# Patient Record
Sex: MALE | Race: WHITE | NOT HISPANIC OR LATINO | URBAN - METROPOLITAN AREA
[De-identification: names, ages, dates, MRNs, and addresses within clinical notes are randomized per-mention and may not be internally consistent; named-entity substitution may affect disease eponyms.]

---

## 2014-09-08 LAB
PATH REPORT.COMMENTS IMP SPEC: ABNORMAL
PATH REPORT.COMMENTS IMP SPEC: ABNORMAL
PATH REPORT.FINAL DX SPEC: ABNORMAL
PATH REPORT.SITE OF ORIGIN SPEC: ABNORMAL
RESULT FLAG (HE HISTORICAL CONVERSION): ABNORMAL

## 2017-01-01 ENCOUNTER — HOME CARE/HOSPICE - HEALTHEAST (OUTPATIENT)
Dept: HOSPICE | Facility: HOSPICE | Age: 77
End: 2017-01-01

## 2017-01-01 ENCOUNTER — OFFICE VISIT - HEALTHEAST (OUTPATIENT)
Dept: ONCOLOGY | Facility: HOSPITAL | Age: 77
End: 2017-01-01

## 2017-01-01 ENCOUNTER — AMBULATORY - HEALTHEAST (OUTPATIENT)
Dept: INFUSION THERAPY | Facility: HOSPITAL | Age: 77
End: 2017-01-01

## 2017-01-01 ENCOUNTER — AMBULATORY - HEALTHEAST (OUTPATIENT)
Dept: ONCOLOGY | Facility: HOSPITAL | Age: 77
End: 2017-01-01

## 2017-01-01 ENCOUNTER — AMBULATORY - HEALTHEAST (OUTPATIENT)
Dept: GERIATRICS | Facility: CLINIC | Age: 77
End: 2017-01-01

## 2017-01-01 ENCOUNTER — COMMUNICATION - HEALTHEAST (OUTPATIENT)
Dept: ONCOLOGY | Facility: HOSPITAL | Age: 77
End: 2017-01-01

## 2017-01-01 ENCOUNTER — OFFICE VISIT - HEALTHEAST (OUTPATIENT)
Dept: GERIATRICS | Facility: CLINIC | Age: 77
End: 2017-01-01

## 2017-01-01 ENCOUNTER — HOSPITAL ENCOUNTER (OUTPATIENT)
Dept: RADIOLOGY | Facility: HOSPITAL | Age: 77
Discharge: HOME | End: 2017-04-27
Attending: INTERNAL MEDICINE

## 2017-01-01 ENCOUNTER — COMMUNICATION - HEALTHEAST (OUTPATIENT)
Dept: ADMINISTRATIVE | Facility: HOSPITAL | Age: 77
End: 2017-01-01

## 2017-01-01 ENCOUNTER — AMBULATORY - HEALTHEAST (OUTPATIENT)
Dept: HOSPICE | Facility: HOSPICE | Age: 77
End: 2017-01-01

## 2017-01-01 ENCOUNTER — RECORDS - HEALTHEAST (OUTPATIENT)
Dept: ADMINISTRATIVE | Facility: OTHER | Age: 77
End: 2017-01-01

## 2017-01-01 ENCOUNTER — COMMUNICATION - HEALTHEAST (OUTPATIENT)
Dept: FAMILY MEDICINE | Facility: CLINIC | Age: 77
End: 2017-01-01

## 2017-01-01 ENCOUNTER — INFUSION - HEALTHEAST (OUTPATIENT)
Dept: INFUSION THERAPY | Facility: HOSPITAL | Age: 77
End: 2017-01-01

## 2017-01-01 ENCOUNTER — OFFICE VISIT - HEALTHEAST (OUTPATIENT)
Dept: OCCUPATIONAL THERAPY | Facility: HOSPITAL | Age: 77
End: 2017-01-01

## 2017-01-01 DIAGNOSIS — R53.83 OTHER MALAISE AND FATIGUE: ICD-10-CM

## 2017-01-01 DIAGNOSIS — J96.01 ACUTE RESPIRATORY FAILURE WITH HYPOXIA (H): ICD-10-CM

## 2017-01-01 DIAGNOSIS — M50.30 DEGENERATION OF CERVICAL INTERVERTEBRAL DISC: ICD-10-CM

## 2017-01-01 DIAGNOSIS — M54.2 CHRONIC NECK PAIN: ICD-10-CM

## 2017-01-01 DIAGNOSIS — D45 POLYCYTHEMIA VERA (H): ICD-10-CM

## 2017-01-01 DIAGNOSIS — M50.30 DISC DISEASE, DEGENERATIVE, CERVICAL: ICD-10-CM

## 2017-01-01 DIAGNOSIS — E87.1 HYPONATREMIA: ICD-10-CM

## 2017-01-01 DIAGNOSIS — C83.13 MANTLE CELL LYMPHOMA OF INTRA-ABDOMINAL LYMPH NODES (H): ICD-10-CM

## 2017-01-01 DIAGNOSIS — J91.0 MALIGNANT PLEURAL EFFUSION (H): ICD-10-CM

## 2017-01-01 DIAGNOSIS — G89.3 CANCER ASSOCIATED PAIN: ICD-10-CM

## 2017-01-01 DIAGNOSIS — M54.6 THORACIC SPINE PAIN: ICD-10-CM

## 2017-01-01 DIAGNOSIS — E43 SEVERE MALNUTRITION (H): ICD-10-CM

## 2017-01-01 DIAGNOSIS — C83.10 MANTLE CELL LYMPHOMA, UNSPECIFIED BODY REGION (H): ICD-10-CM

## 2017-01-01 DIAGNOSIS — F11.20 OPIOID DEPENDENCE, CONTINUOUS (H): ICD-10-CM

## 2017-01-01 DIAGNOSIS — R53.81 OTHER MALAISE AND FATIGUE: ICD-10-CM

## 2017-01-01 DIAGNOSIS — M54.2 CERVICALGIA: ICD-10-CM

## 2017-01-01 DIAGNOSIS — G60.9 HEREDITARY AND IDIOPATHIC PERIPHERAL NEUROPATHY: ICD-10-CM

## 2017-01-01 DIAGNOSIS — G89.29 CHRONIC NECK PAIN: ICD-10-CM

## 2017-01-01 DIAGNOSIS — M79.18 MYOFASCIAL MUSCLE PAIN: ICD-10-CM

## 2017-01-01 DIAGNOSIS — M54.2 NECK PAIN: ICD-10-CM

## 2017-01-01 DIAGNOSIS — F32.9 MAJOR DEPRESSIVE DISORDER, SINGLE EPISODE, UNSPECIFIED: ICD-10-CM

## 2017-01-01 DIAGNOSIS — D47.2 MONOCLONAL PARAPROTEINEMIA: ICD-10-CM

## 2017-01-01 DIAGNOSIS — F43.21 ADJUSTMENT DISORDER WITH DEPRESSED MOOD: ICD-10-CM

## 2017-01-01 DIAGNOSIS — R10.9 ABDOMINAL PAIN: ICD-10-CM

## 2017-01-01 ASSESSMENT — MIFFLIN-ST. JEOR: SCORE: 1459.21

## 2017-06-12 ENCOUNTER — COMMUNICATION - HEALTHEAST (OUTPATIENT)
Dept: FAMILY MEDICINE | Facility: CLINIC | Age: 77
End: 2017-06-12

## 2017-06-14 ENCOUNTER — RECORDS - HEALTHEAST (OUTPATIENT)
Dept: ADMINISTRATIVE | Facility: OTHER | Age: 77
End: 2017-06-14

## 2021-05-30 VITALS — BODY MASS INDEX: 21.7 KG/M2 | WEIGHT: 160 LBS

## 2021-05-30 VITALS — WEIGHT: 145.5 LBS | BODY MASS INDEX: 19.2 KG/M2

## 2021-05-30 VITALS — BODY MASS INDEX: 22.15 KG/M2 | WEIGHT: 163.3 LBS

## 2021-05-30 VITALS — WEIGHT: 160.5 LBS | BODY MASS INDEX: 21.77 KG/M2

## 2021-05-30 VITALS — HEIGHT: 73 IN | WEIGHT: 156.6 LBS | BODY MASS INDEX: 20.75 KG/M2

## 2021-05-30 VITALS — WEIGHT: 161.4 LBS | BODY MASS INDEX: 21.89 KG/M2

## 2021-05-31 ENCOUNTER — RECORDS - HEALTHEAST (OUTPATIENT)
Dept: ADMINISTRATIVE | Facility: CLINIC | Age: 81
End: 2021-05-31

## 2021-06-01 ENCOUNTER — RECORDS - HEALTHEAST (OUTPATIENT)
Dept: ADMINISTRATIVE | Facility: CLINIC | Age: 81
End: 2021-06-01

## 2021-06-02 ENCOUNTER — RECORDS - HEALTHEAST (OUTPATIENT)
Dept: ADMINISTRATIVE | Facility: CLINIC | Age: 81
End: 2021-06-02

## 2021-06-05 ENCOUNTER — RECORDS - HEALTHEAST (OUTPATIENT)
Dept: PALLIATIVE MEDICINE | Facility: CLINIC | Age: 81
End: 2021-06-05

## 2021-06-05 ENCOUNTER — RECORDS - HEALTHEAST (OUTPATIENT)
Dept: LAB | Facility: CLINIC | Age: 81
End: 2021-06-05

## 2021-06-05 DIAGNOSIS — M47.812 CERVICAL SPONDYLOSIS WITHOUT MYELOPATHY: ICD-10-CM

## 2021-06-05 DIAGNOSIS — M47.12 CERVICAL SPONDYLOSIS WITH MYELOPATHY: ICD-10-CM

## 2021-06-05 DIAGNOSIS — D47.2 MONOCLONAL PARAPROTEINEMIA: ICD-10-CM

## 2021-06-08 NOTE — PROGRESS NOTES
[facility] Cancer Care Progress Note    Patient: Frankie Serna Jr.  MRN: [id number]  Date of Service: 1/25/2017        Reason for visit      1. Polycythemia vera    2. 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 Cervicalgia    4. Thoracic spine pain    5. Myofascial muscle pain        Assessment     1. Mantle cell lymphoma diagnosed with biopsy of right abdominal lymph node. Stage III or perhaps stage IV.  This was progressive with bilateral pleural effusions and massive lymphadenopathy and splenic megaly.  Started on palliative treatment with Imbruvica on November 23, 2016.  Response visible. His WBC is going up.  2. History of polycythemia currently on treatment with hydroxyurea and phlebotomy.  Current labs are stable.  3. Neuropathy.  4. 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  5. Back and abdominal discomfort which is most likely from his lymphoma.  He is currently on oxycodone 20 mg 4-5 times a day.  OxyContin apparently did not help.     Plan     1. Continue Imbruvica 560 mg by mouth daily.  Follow his labs and monitor for side effects.  2. Continue to take Hydrea same dose.   3. Discussed with him and his wife about decreasing pain medication.  4. Follow-up with me in 6 weeks' time.  5. Rx for baclofen sent.    Clinical stage      Possibly stage II    History     Frankie Serna Jr. is a very pleasant 76 y.o. old male with a history of lymphadenopathy located in his pelvis measuring up to 2.5 cm in size presenting on a CAT scan done for an unrelated reason back in September 2014.  This has worsened on his most recent CT scan done in April 2015.  This is by no means of bulky adenopathy however.  This has not been biopsied.  He has a prior history of polycythemia vera for which I saw him way back in 2011.  I did start him on hydroxyurea.  Which he has taken off and on.  He however then switched his care to [place] [facility].  Has been followed by doctors over there.        Wife thinks he has gone down neurologically. He has been seen at [facility] neurology clinic.    In August 2015 he had biopsy of the right external iliac LN biopsied that showed Mantle cell lymphoma. He was not recommended any treatment by Dr. North due to his comorbities.  He has since been followed.  He noticed more lymph nodes in his neck and armpit so came here to discuss his treatment options.  He was seen by Dr. North second week of October 2016.  Dr. North recommended hospice care.  He did not like it.  I offered to treat him with Ibrutinib if we can obtain it for him.  He was able to get that medication through a fanta and started that on 23rd of November 2016.  He thinks his lymph nodes might be getting slightly smaller.  He has some rash on his face and chest.    He continues to have pain and continues to take a lot of pain medication.  He describes having pain all over and his body but specifically sign his neck and back.  According to his wife he forgets when he has taken his last pain medication and insistent get getting another one. Recently I decreased his oxycodone to max of 8 pills a day. He is not happy with that. Comes in today for f/u.    Past Medical History     Past Medical History   Diagnosis Date     Depression      Hypertension      Lymphoma, non-Hodgkin's      Neuropathy      Polycythemia      Sleep apnea        Review of Systems   Constitutional  Constitutional (WDL): Exceptions to WDL  Fatigue: Fatigue not relieved by rest - Limiting instrumental ADL  Fever: None  Chills: None  Weight Gain: None  Weight Loss: None  Neurosensory  Neurosensory (WDL): All neurosensory elements are within defined limits  Cardiovascular  Cardiovascular (WDL): All cardiovascular elements are within defined limits  Pulmonary  Respiratory (WDL): Within Defined Limits  Gastrointestinal  Gastrointestinal (WDL): Exceptions to WDL  Anorexia: Oral intake altered without significant weight loss or malnutrition, oral  nutritional supplements indicated  Constipation: None  Diarrhea: None  Dysphagia: None  Esophagitis: None  Nausea: None  Pharyngitis: None  Vomiting: None  Dysgeusia: None  Dry Mouth: None  Genitourinary  Genitourinary (WDL): Exceptions to WDL  Urinary Frequency: Present  Urinary Retention: None  Urinary Tract Pain: None  Integumentary  Integumentary (WDL): Exceptions to WDL  Alopecia: None  Rash Maculo-Papular: None  Pruritus: None  Urticaria: Urticarial lesions covering <10% BSA, topical intervention indicated (sides of face)  Palmar-Plantar Erythrodysesthesia Syndrome: None  Flushing: None  Patient Coping  Patient Coping: Accepting  Accompanied by  Accompanied by: Family Member    ECOG performance status and Distress Assessment      ECOG Performance:    ECOG Performance Status: 1    Distress Assessment  Distress Assessment Score: No distress:     Pain Status  Currently in Pain: Yes    Vital Signs     Vitals:    01/25/17 1522   BP: 162/67   Pulse: 69   Temp: 97.4  F (36.3  C)   SpO2: 95%       Physical Exam     GENERAL: No acute distress. Cooperative in conversation.   HEENT: Pupils are equal, round and reactive. Oral mucosa is clean and intact. No ulcerations or mucositis noted. No bleeding noted.  RESP:Chest symmetric lungs are clear bilaterally per auscultation. Regular respiratory rate. No wheezes or rhonchi.  CV: Normal S1 S2 Regular, rate and rhythm. No murmurs.  ABD: Nondistended, soft, nontender. Positive bowel sounds. No organomegaly.   EXTREMITIES: No lower extremity edema.   NEURO: Non- focal. Alert and oriented x3.  Cranial nerves appear intact. He does have short term memory loss.  PSYCH: Within normal limits. No depression or anxiety.  SKIN: Fine rash on his face and chest. Warm dry intact.    LYMPH NODES: Bilateral cervical, supraclavicular, axillary lymph node examination was done.  Fairly good response in axillary adenopathy right more than the left.  The right axillary adenopathies is about 3 cm  in size previously 7 cm.  This appears smaller than before.  He also has enlarged lymph node in his neck in both posterior as well as some anterior cervical chains.  These lymph nodes are also mildly smaller in size.    Lab Results     Results for orders placed or performed in visit on 01/25/17   Magnesium   Result Value Ref Range    Magnesium 1.8 1.8 - 2.6 mg/dL   Comprehensive Metabolic Panel   Result Value Ref Range    Sodium 137 136 - 145 mmol/L    Potassium 4.2 3.5 - 5.0 mmol/L    Chloride 100 98 - 107 mmol/L    CO2 33 (H) 22 - 31 mmol/L    Anion Gap, Calculation 4 (L) 5 - 18 mmol/L    Glucose 72 70 - 125 mg/dL    BUN 21 8 - 28 mg/dL    Creatinine 0.81 0.70 - 1.30 mg/dL    GFR MDRD Af Amer >60 >60 mL/min/1.73m2    GFR MDRD Non Af Amer >60 >60 mL/min/1.73m2    Bilirubin, Total 0.7 0.0 - 1.0 mg/dL    Calcium 9.3 8.5 - 10.5 mg/dL    Protein, Total 7.2 6.0 - 8.0 g/dL    Albumin 3.3 (L) 3.5 - 5.0 g/dL    Alkaline Phosphatase 298 (H) 45 - 120 U/L    AST 33 0 - 40 U/L    ALT 39 0 - 45 U/L   Uric Acid   Result Value Ref Range    Uric Acid 8.3 (H) 3.0 - 8.0 mg/dL   HM1 (CBC with Diff)   Result Value Ref Range    WBC 43.3 (HH) 4.0 - 11.0 thou/uL    RBC 5.09 4.40 - 6.20 mill/uL    Hemoglobin 14.9 14.0 - 18.0 g/dL    Hematocrit 46.1 40.0 - 54.0 %    MCV 91 80 - 100 fL    MCH 29.2 27.0 - 34.0 pg    MCHC 32.2 32.0 - 36.0 g/dL    RDW 21.2 (H) 11.0 - 14.5 %    Platelets 430 140 - 440 thou/uL    MPV 7.4 7.0 - 10.0 fL     Reviewed path report from [place].    Imaging Results     No results found.    Total time spent was 40 minutes, more than half of it was in face-to-face counseling regarding disease state, treatment, side effects and management.      Derick Mendoza MD

## 2021-06-08 NOTE — PROGRESS NOTES
[facility]  Coping with Chronic Pain Group      Name:  Frankie Serna Jr.  :  1940  MRN:  [id number]    This is a dual signature report. My supervising clinician is Christiana Maier Psy.D., MAHNAZ.    Frequency:  Weekly  Facilitator:  Christiana Maier Psy.D., MAHNAZ SMITH and Sofía King PsyD, Postdoctoral Psychology Fellow    Date:  02/15/2017  Duration:  60 Minutes (1:00-2:00pm)   N= 7    The patient participated in a group session in which members processed the impact of mental health symptoms on their beliefs, feelings and behaviors.    Mental Status:    Mood:  euthymic  Affect:  Some evidence of flat affect and apathy  Suicidal Ideation:  absent  Homicidal Ideation:  absent  Thought process:  Somewhat tangential but easily redirected  Thought content:  Normal  Attention/Concentration:  intact  Language ability:  intact  Insight and Judgement:  fair  Orientation:  person, place, time and situation      SUBJECTIVE/OBJECTIVE:   Group content and interventions performed in this session:  [X]  Check-in.  [X]  Group members discussed triggers/coping strategies for mental health symptoms.  [X]  Psychologist taught connection between events, thoughts, feelings.  [X]  Psychologist elicited group reactions, questions, and normalized feelings.    The patient indicated readiness to learn by the patient s choice to attend group.  Patient education on the above topics was provided during this session.  The patient was given an opportunity to ask questions and participate in the group discussion.  The patient exhibited individual understanding by asking relevant questions and making appropriate comments to other group members.  No barriers to learning evidenced.    NECESSITY:  The group session was necessary for the care of the patient to address how his chronic pain affects the patient s mental health, self-esteem, relationships with self and others, sobriety, coping skills, physical capabilities and overall  well being.    ASSESSMENT:  The patient participated Actively in today's session.  The topic was on an introduction to group and sharing pain stories. Patient has wide-spread cancer related pain, which he feels has taken a lot from his quality of life. Patient experiences some cognitive decline and memory loss. He displayed minor apathy but was polite to others in group.   There was no evidence of suicidal ideation, intent or plan.    The patient's diagnoses include:  Adjustment Disorder with Depressed Mood    PLAN:    Continue weekly Coping with Chronic Pain Group Therapy to address mental health symptoms and improve quality of life.    Provider:  Sofía King PsyD, Postdoctoral Psychology Fellow    Date:  2/16/2017  Time:  8:48 AM    Services performed and documented by Sofía King PsyD, Postdoctoral Psychology Fellow    Supervisor: Christiana Maier Psy.D., LUIS, LP    I have read, discussed and agree with the documentation provided by Sofía King Psy.D., Postdoctoral Psychology Fellow.  Christiana Maier PsyD, LUIS, LP

## 2021-06-09 NOTE — PROGRESS NOTES
Telephone note.    Patients wife called that his pain is getting worse.  He is requesting pain medications every 3 hours instead of every 6 hours.  In addition he has not been able to get imbruvica due to insurance and financial reasons.  So I suspect his pain is probably getting worse because his lymphoma is getting worse.  I offered to treat him with some steroids but she insists that we should probably give him narcotics.  Due to the end-stage nature of his disease I am going to increase his narcotic pain medication.

## 2021-06-09 NOTE — PROGRESS NOTES
[facility] Cancer Care Progress Note    Patient: Frankie Serna Jr.  MRN: [id number]  Date of Service: 4/5/2017        Reason for visit      1. 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 Mantle cell lymphoma diagnosed with biopsy of right abdominal lymph node. Stage III or perhaps stage IV.  This was progressive with bilateral pleural effusions and massive lymphadenopathy and splenic megaly.  Started on palliative treatment with Imbruvica on November 23, 2016.  Response visible. However, he has not been able to get Imbruvica after initial 8 weeks of it. Now off of it due to insurance reasons. Apparently cannot afford Rituxan either. His LN are getting bigger.  2. History of polycythemia currently on treatment with hydroxyurea and phlebotomy.  Current labs are stable.  3. Neuropathy.  4. 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  5. Back and abdominal discomfort which is most likely from his lymphoma.  He is currently on oxycodone 20 mg 6 times a day.  OxyContin apparently did not help.   6. Ear wax impaction right ear.    Plan     1. Discussed with him that we don't have any other option for him that he can afford. I can try asking drug company for compassionate use of Opdivo to see if that would help.  2. Continue to take Hydrea same dose.   3. Discussed with him and his wife about  pain medication.  4. Advised to use positioning to help his shoulder pain.  5 advised for him to see an ENT to disimpact his right ear.    Clinical stage      Possibly stage II    History     Frankie Serna Jr. is a very pleasant 76 y.o. old male with a history of lymphadenopathy located in his pelvis measuring up to 2.5 cm in size presenting on a CAT scan done for an unrelated reason back in September 2014.  This has worsened on his most recent CT scan done in April 2015.  This is by no means of bulky adenopathy however.  This has not been biopsied.  He has a prior history of  polycythemia vera for which I saw him way back in 2011.  I did start him on hydroxyurea.  Which he has taken off and on.  He however then switched his care to [place] [facility].  Has been followed by doctors over there.       Wife thinks he has gone down neurologically. He has been seen at [facility] neurology clinic.    In August 2015 he had biopsy of the right external iliac LN biopsied that showed Mantle cell lymphoma. He was not recommended any treatment by Dr. North due to his comorbities.  He has since been followed.  He noticed more lymph nodes in his neck and armpit so came here to discuss his treatment options.  He was seen by Dr. North second week of October 2016.  Dr. North recommended hospice care.  He did not like it.  I offered to treat him with Ibrutinib if we can obtain it for him.  He was able to get that medication through a fanta and started that on 23rd of November 2016.  He thinks his lymph nodes might be getting slightly smaller.  He has some rash on his face and chest.    He continues to have pain and continues to take a lot of pain medication.  He describes having pain all over and his body but specifically sign his neck and back towards the left side.  According to his wife he forgets when he has taken his last pain medication and insistent get getting another one.  I had to increase the dose of his oxycodone to 20 mg 6 times a day total of 120 mg daily.  His pain is still there and according to his wife he wakes her up sometime even in the middle of the night to give him the pain medication. He is not happy with that.   In the meantime he has not been able to procure a Ibrutinib due to insurance reasons.  He only took it for about 8 weeks starting Thanksgiving of 2016 to January 16, 2017.  He had good response to it but now his lymph nodes are starting to get bigger again.  I then offered to treat him with single agent rituximab.  After checking through insurance that also was not financially  "feasible for them.    She comes in today to discuss his care as well as sharing concerned that he is developing lower extremity edema.  She tells me that his pain was significantly worse couple nights ago.  Then he laid down in his recliner and in fact did not require any pain medication for over 12 hours.  Since that time his pain has not been as bad.  He also continues to have difficult time hearing.  He does have plugged ear on his right side with earwax.    Past Medical History     Past Medical History:   Diagnosis Date     Depression      Hypertension      Lymphoma, non-Hodgkin's      Neuropathy      Polycythemia      Sleep apnea        Review of Systems   Constitutional  Constitutional (WDL): Exceptions to WDL  Fatigue: Fatigue relieved by rest  Neurosensory  Neurosensory (WDL): Exceptions to WDL  Ataxia: Asymptomatic, clinical or diagnostic observations only, intervention not indicated  Peripheral Sensory Neuropathy: Asymptomatic, loss of deep tendon reflexes or paresthesia (\"Toes ache\")  Cardiovascular  Cardiovascular (WDL): All cardiovascular elements are within defined limits  Pulmonary  Respiratory (WDL): Exceptions to WDL  Cough: Mild symptoms, nonprescription intervention indicated  Dyspnea: Shortness of breath with moderate exertion  Gastrointestinal  Gastrointestinal (WDL): Exceptions to WDL  Anorexia: Loss of appetite without alteration in eating habits  Constipation: Occasional or intermittent symptoms, occasional use of stool softeners, laxatives, dietary modification, or enema  Genitourinary  Genitourinary (WDL): All genitourinary elements are within defined limits  Integumentary  Integumentary (WDL): All integumentary elements are within defined limits  Patient Coping  Patient Coping: Accepting  Accompanied by  Accompanied by: Alone    ECOG performance status and Distress Assessment      ECOG Performance:    ECOG Performance Status: 1    Distress Assessment  Distress Assessment Score: 2:     Pain " Status  Currently in Pain: Yes    Vital Signs     Vitals:    04/05/17 1521   BP: 124/58   Pulse: 99   Temp: 98.1  F (36.7  C)   SpO2: 90%       Physical Exam     GENERAL: No acute distress. Cooperative in conversation.   HEENT: Pupils are equal, round and reactive. Oral mucosa is clean and intact. No ulcerations or mucositis noted. No bleeding noted.  His right ear is plugged with wax.  RESP:Chest symmetric lungs are clear bilaterally per auscultation. Regular respiratory rate. No wheezes or rhonchi.  CV: Normal S1 S2 Regular, rate and rhythm. No murmurs.  ABD: Nondistended, soft, nontender. Positive bowel sounds. No organomegaly.   EXTREMITIES: He has 2+ swelling on the dorsum of both of his feet.  Scrotal swelling as well.  NEURO: Non- focal. Alert and oriented x3.  Cranial nerves appear intact. He does have short term memory loss.  PSYCH: Within normal limits. No depression or anxiety.  SKIN: Fine rash on his face and chest. Warm dry intact.    LYMPH NODES: Bilateral cervical, supraclavicular, axillary lymph node examination was done. The LN are getting bigger. The right axillary adenopathies is about 4.5 cm in size previously 4cm.  This appears bigger than before.  He also has enlarged lymph node in his neck in both posterior as well as some anterior cervical chains.     Lab Results     Results for orders placed or performed in visit on 03/13/17   Comprehensive Metabolic Panel   Result Value Ref Range    Sodium 137 136 - 145 mmol/L    Potassium 4.5 3.5 - 5.0 mmol/L    Chloride 100 98 - 107 mmol/L    CO2 33 (H) 22 - 31 mmol/L    Anion Gap, Calculation 4 (L) 5 - 18 mmol/L    Glucose 100 70 - 125 mg/dL    BUN 20 8 - 28 mg/dL    Creatinine 1.04 0.70 - 1.30 mg/dL    GFR MDRD Af Amer >60 >60 mL/min/1.73m2    GFR MDRD Non Af Amer >60 >60 mL/min/1.73m2    Bilirubin, Total 0.6 0.0 - 1.0 mg/dL    Calcium 9.5 8.5 - 10.5 mg/dL    Protein, Total 7.5 6.0 - 8.0 g/dL    Albumin 3.4 (L) 3.5 - 5.0 g/dL    Alkaline Phosphatase 300  (H) 45 - 120 U/L    AST 28 0 - 40 U/L    ALT 20 0 - 45 U/L   Lactate Dehydrogenase (LDH)   Result Value Ref Range    LD (LDH) 313 (H) 125 - 220 U/L   HM1 (CBC with Diff)   Result Value Ref Range    WBC 15.1 (H) 4.0 - 11.0 thou/uL    RBC 4.87 4.40 - 6.20 mill/uL    Hemoglobin 15.9 14.0 - 18.0 g/dL    Hematocrit 47.0 40.0 - 54.0 %    MCV 97 80 - 100 fL    MCH 32.7 27.0 - 34.0 pg    MCHC 33.9 32.0 - 36.0 g/dL    RDW 21.5 (H) 11.0 - 14.5 %    Platelets 483 (H) 140 - 440 thou/uL    MPV 6.8 (L) 7.0 - 10.0 fL    Neutrophils % 86 (H) 50 - 70 %    Lymphocytes % 9 (L) 20 - 40 %    Monocytes % 3 2 - 10 %    Eosinophils % 2 0 - 6 %    Basophils % 0 0 - 2 %    Neutrophils Absolute 13.0 (H) 2.0 - 7.7 thou/uL    Lymphocytes Absolute 1.4 0.8 - 4.4 thou/uL    Monocytes Absolute 0.4 0.0 - 0.9 thou/uL    Eosinophils Absolute 0.3 0.0 - 0.4 thou/uL    Basophils Absolute 0.0 0.0 - 0.2 thou/uL   Manual Differential   Result Value Ref Range    Platelet Estimate Normal Normal    Ovalocytes 1+ (!) Negative         Imaging Results     No results found.          Derick Mendoza MD

## 2021-06-09 NOTE — PROGRESS NOTES
Psychology Psychotherapy Group  Coping with Chronic Pain Group      Name:  Frankie Serna Jr.  :  1940  MRN:  [id number]    This is a dual signature report. My supervising clinician is Christiana Maier Psy.D., MAHNAZ.    Frequency:  Weekly  Facilitator:  Christiana Maier Psy.D., MAHNAZ SMITH and Sofía King PsyD, Postdoctoral Psychology Fellow    Date:  02/15/2017  Duration:  60 Minutes (1:00-2:00pm)   N= 5    The patient participated in a group session in which members processed the impact of mental health symptoms on their beliefs, feelings and behaviors.    Mental Status:    Mood:  euthymic  Affect:  Some evidence of flat affect and apathy  Suicidal Ideation:  absent  Homicidal Ideation:  absent  Thought process:  Somewhat tangential but easily redirected  Thought content:  Normal  Attention/Concentration:  intact  Language ability:  intact  Insight and Judgement:  fair  Orientation:  person, place, time and situation      SUBJECTIVE/OBJECTIVE:   Group content and interventions performed in this session:  [X]  Check-in.  [X]  Group members discussed triggers/coping strategies for mental health symptoms.  [X]  Psychologist taught connection between events, thoughts, feelings.  [X]  Psychologist elicited group reactions, questions, and normalized feelings.    The patient indicated readiness to learn by the patient s choice to attend group.  Patient education on the above topics was provided during this session.  The patient was given an opportunity to ask questions and participate in the group discussion.  The patient exhibited individual understanding by asking relevant questions and making appropriate comments to other group members.  No barriers to learning evidenced.    NECESSITY:  The group session was necessary for the care of the patient to address how his chronic pain affects the patient s mental health, self-esteem, relationships with self and others, sobriety, coping skills, physical capabilities and overall  well being.    ASSESSMENT:  The patient participated minimally in today's session.  The topic was on education on the chronic pain cycle and how pain thoughts affect feelings and behaviors. Patient was more quite today but did share a few jokes with the group. He appeared to be retaining information and engaged in the five minute meditation without any concerns.   There was no evidence of suicidal ideation, intent or plan.    The patient's diagnoses include:  Adjustment Disorder with Depressed Mood    PLAN:    Continue weekly Coping with Chronic Pain Group Therapy to address mental health symptoms and improve quality of life.    Provider:  Sofía King PsyD, Postdoctoral Psychology Fellow    Date:  2/24/2017  Time:  8:48 AM    Services performed and documented by Sofía King PsyD, Postdoctoral Psychology Fellow    Supervisor: Christiana Maier Psy.D., LUIS, MAHNAZ    I have read, discussed and agree with the documentation provided by Sofía King Psy.D., Postdoctoral Psychology Fellow.  Christiana Maier PsyD, LUIS, LP

## 2021-06-09 NOTE — PROGRESS NOTES
[facility] Cancer Care Progress Note    Patient: Frankie Serna Jr.  MRN: [id number]  Date of Service: 3/13/2017        Reason for visit      1. Polycythemia vera    2. 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 Cervical Disc Degeneration    4. Abdominal pain        Assessment     1. Mantle cell lymphoma diagnosed with biopsy of right abdominal lymph node. Stage III or perhaps stage IV.  This was progressive with bilateral pleural effusions and massive lymphadenopathy and splenic megaly.  Started on palliative treatment with Imbruvica on November 23, 2016.  Response visible. However, he has not been able to get Imbruvica after initial 8 weeks of it. Now off of it due to insurance reasons.  2. History of polycythemia currently on treatment with hydroxyurea and phlebotomy.  Current labs are stable.  3. Neuropathy.  4. 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  5. Back and abdominal discomfort which is most likely from his lymphoma.  He is currently on oxycodone 20 mg 6 times a day.  OxyContin apparently did not help.     Plan     1. Discussed with him that although Imbruvica is the better choice, he is not able to obtain it. So will try Rituxan.  We will plan on starting his treatment next week.  He is not a candidate for cytotoxic chemotherapy as it is likely to make his dementia worse.  Rituximab might afford him some stabilization of his disease.  2. Continue to take Hydrea same dose.   3. Discussed with him and his wife about  pain medication.  4. Follow-up with vel Valdez in a week to start treatment.    Clinical stage      Possibly stage II    History     Frankie Serna Jr. is a very pleasant 76 y.o. old male with a history of lymphadenopathy located in his pelvis measuring up to 2.5 cm in size presenting on a CAT scan done for an unrelated reason back in September 2014.  This has worsened on his most recent CT scan done in April 2015.  This is by no means of bulky adenopathy  however.  This has not been biopsied.  He has a prior history of polycythemia vera for which I saw him way back in 2011.  I did start him on hydroxyurea.  Which he has taken off and on.  He however then switched his care to [place] [facility].  Has been followed by doctors over there.       Wife thinks he has gone down neurologically. He has been seen at [facility] neurology clinic.    In August 2015 he had biopsy of the right external iliac LN biopsied that showed Mantle cell lymphoma. He was not recommended any treatment by Dr. North due to his comorbities.  He has since been followed.  He noticed more lymph nodes in his neck and armpit so came here to discuss his treatment options.  He was seen by Dr. North second week of October 2016.  Dr. North recommended hospice care.  He did not like it.  I offered to treat him with Ibrutinib if we can obtain it for him.  He was able to get that medication through a fanta and started that on 23rd of November 2016.  He thinks his lymph nodes might be getting slightly smaller.  He has some rash on his face and chest.    He continues to have pain and continues to take a lot of pain medication.  He describes having pain all over and his body but specifically sign his neck and back.  According to his wife he forgets when he has taken his last pain medication and insistent get getting another one. Recently I had to increase the dose of his oxycodone to 20 mg 6 times a day total of 120 mg daily.  His pain is still there and according to his wife he wakes her up sometime even in the middle of the night to give him the pain medication. He is not happy with that. Comes in today for f/u.  In the meantime he has not been able to procure a Ibrutinib due to insurance reasons.  He only took it for about 8 weeks starting Thanksgiving of 2016.  He had good response to it but now his lymph nodes are starting to get bigger again.    Past Medical History     Past Medical History:   Diagnosis Date      Depression      Hypertension      Lymphoma, non-Hodgkin's      Neuropathy      Polycythemia      Sleep apnea        Review of Systems   Constitutional  Constitutional (WDL): Exceptions to WDL  Fatigue: Fatigue relieved by rest  Neurosensory  Neurosensory (WDL): Exceptions to WDL  Ataxia: Asymptomatic, clinical or diagnostic observations only, intervention not indicated (off balance)  Peripheral Sensory Neuropathy: Asymptomatic, loss of deep tendon reflexes or paresthesia (toes)  Cardiovascular  Cardiovascular (WDL): All cardiovascular elements are within defined limits  Pulmonary  Respiratory (WDL): Exceptions to WDL  Cough: Mild symptoms, nonprescription intervention indicated (gray to green phlem)  Dyspnea: Shortness of breath with moderate exertion  Gastrointestinal  Gastrointestinal (WDL): Exceptions to WDL  Anorexia: Loss of appetite without alteration in eating habits  Constipation: Occasional or intermittent symptoms, occasional use of stool softeners, laxatives, dietary modification, or enema  Genitourinary  Genitourinary (WDL): All genitourinary elements are within defined limits  Integumentary  Integumentary (WDL): Exceptions to WDL  Rash Maculo-Papular: Macules/papules covering <10% BSA with or without symptoms (e.g., pruritus, burning, tightness) (face)  Patient Coping  Patient Coping: Accepting  Accompanied by  Accompanied by: Family Member    ECOG performance status and Distress Assessment      ECOG Performance:    ECOG Performance Status: 1    Distress Assessment  Distress Assessment Score: 2:     Pain Status  Currently in Pain: Yes    Vital Signs     Vitals:    03/13/17 1328   BP: 141/65   Pulse: (!) 104   Temp: 97.6  F (36.4  C)   SpO2: 90%       Physical Exam     GENERAL: No acute distress. Cooperative in conversation.   HEENT: Pupils are equal, round and reactive. Oral mucosa is clean and intact. No ulcerations or mucositis noted. No bleeding noted.  RESP:Chest symmetric lungs are clear  bilaterally per auscultation. Regular respiratory rate. No wheezes or rhonchi.  CV: Normal S1 S2 Regular, rate and rhythm. No murmurs.  ABD: Nondistended, soft, nontender. Positive bowel sounds. No organomegaly.   EXTREMITIES: No lower extremity edema.   NEURO: Non- focal. Alert and oriented x3.  Cranial nerves appear intact. He does have short term memory loss.  PSYCH: Within normal limits. No depression or anxiety.  SKIN: Fine rash on his face and chest. Warm dry intact.    LYMPH NODES: Bilateral cervical, supraclavicular, axillary lymph node examination was done.  Fairly good response in axillary adenopathy right more than the left.  The right axillary adenopathies is about 4 cm in size previously 3 cm.  This appears bigger than before.  He also has enlarged lymph node in his neck in both posterior as well as some anterior cervical chains.  These lymph nodes are stable in size.    Lab Results     Results for orders placed or performed in visit on 03/13/17   Comprehensive Metabolic Panel   Result Value Ref Range    Sodium 137 136 - 145 mmol/L    Potassium 4.5 3.5 - 5.0 mmol/L    Chloride 100 98 - 107 mmol/L    CO2 33 (H) 22 - 31 mmol/L    Anion Gap, Calculation 4 (L) 5 - 18 mmol/L    Glucose 100 70 - 125 mg/dL    BUN 20 8 - 28 mg/dL    Creatinine 1.04 0.70 - 1.30 mg/dL    GFR MDRD Af Amer >60 >60 mL/min/1.73m2    GFR MDRD Non Af Amer >60 >60 mL/min/1.73m2    Bilirubin, Total 0.6 0.0 - 1.0 mg/dL    Calcium 9.5 8.5 - 10.5 mg/dL    Protein, Total 7.5 6.0 - 8.0 g/dL    Albumin 3.4 (L) 3.5 - 5.0 g/dL    Alkaline Phosphatase 300 (H) 45 - 120 U/L    AST 28 0 - 40 U/L    ALT 20 0 - 45 U/L   Lactate Dehydrogenase (LDH)   Result Value Ref Range    LD (LDH) 313 (H) 125 - 220 U/L   HM1 (CBC with Diff)   Result Value Ref Range    WBC 15.1 (H) 4.0 - 11.0 thou/uL    RBC 4.87 4.40 - 6.20 mill/uL    Hemoglobin 15.9 14.0 - 18.0 g/dL    Hematocrit 47.0 40.0 - 54.0 %    MCV 97 80 - 100 fL    MCH 32.7 27.0 - 34.0 pg    MCHC 33.9 32.0  - 36.0 g/dL    RDW 21.5 (H) 11.0 - 14.5 %    Platelets 483 (H) 140 - 440 thou/uL    MPV 6.8 (L) 7.0 - 10.0 fL     Reviewed path report from [place].    Imaging Results     No results found.    Total time spent was 40 minutes, more than half of it was in face-to-face counseling regarding disease state, treatment, side effects and management.      Derick Mendoza MD

## 2021-06-10 NOTE — PROGRESS NOTES
[facility]      Facility:    [facility] [[id number]]    Code Status: DNR/DNI/ Limited Interventions      Chief Complaint/Reason for Visit:  Chief Complaint   Patient presents with     Problem Visit     dyspnea/ malignant pleural effusion       HPI:   Frankie Serna Jr. is a 77 y.o. old male with history of Mantle Cell Lymphoma on palliative chemotherapy with Opdivo directly admitted to [facility] from oncology clinic. The patient minimizes his symptoms but his wife reports that he has been increasingly dyspneic on exertion and at rest. He has become increasingly weak , has minimlal appetite and has lost 30 pounds in about 3 months.He was saturating at less than 85% at the oncology clinic,       He was seen in oncology clinic today due to these symptoms and he was found to have large bilateral pleural effusions and hypoxia.  He had pleurocentesis on April 28 on the left side with 1.1 L, had a right tap on May 1 with 1.5 L. Complete cytology was pending at the time of his admission to TCU, but were thought to be malignant. He did not have a lot of relief after his pleural effusions were tapped.He had had some treatment for his mantle cell lymphoma but could not afford all the treatments. No fevers or chills but he does report hot flashes. No nausea or vomiting. His wife also reports that he is increasingly forgetful.          He was seen by palliative care, pain consult. They also approach the idea of hospice, he wanted to go to TCU possible that upon discharge from TCU he will enter into hospice. 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     He is attempting to do therapy but is very weak and deconditioned.    Family Conference today: I joined near the end, met with his wife (Racheal) and Julian; hard for her to care for him as weak as he is. We discussed that there may be need for changes upon discharge.    Past Medical History:  Past Medical History:    Diagnosis Date     Arthritis      Asthma      CHF (congestive heart failure)      Coronary artery disease      Depression      Disease of thyroid gland      Hypertension      Lymphoma, non-Hodgkin's      Neuropathy      Polycythemia      Sleep apnea            Surgical History:  Past Surgical History:   Procedure Laterality Date     ABDOMINAL SURGERY       BACK SURGERY       CARDIAC SURGERY       COLON SURGERY       EYE SURGERY       FRACTURE SURGERY       JOINT REPLACEMENT       SKIN BIOPSY       TONSILLECTOMY       VAGAL NERVE STIMULATOR REMOVAL              Review of Systems   Complains that his neck pain is not well controlled, although it is chronic  Feels about the same with regard to breathing and energy: both hard    Blood pressure 122/73, pulse 69, temperature 96.8  F (36  C), resp. rate 20, SpO2 95 %.          Physical Exam  Cachectic, pale, somewhat irritable today  Quiet  Neck: has a u-shaped pillow under his neck  Wide spread adenopathy neck, axillae, supraclavicular: enlarged and rubbery   Cardiovascular: Regular rhythm and normal heart sounds.  No murmur heard.  Pulmonary/Chest: He has wheezes. He has no rales, decreased at the posterior bases   Abdominal: Soft. There is no tenderness.   Musculoskeletal: He exhibits edema. Weak with low muscle mass   Lymphadenopathy:     He has cervical, axillary, supraclavicular adenopathy.   Neurological: He is alert and oriented to person, place, and time.   Skin: Skin is warm and dry. No rash noted. There is pallor.   Psychiatric: His behavior is normal.   Flat affect       Therapy report: transfers are unsafe, walked 400 ' FWW,stood 3 minutes, needs contact guard using the toilet. Bailey 24        Medication List:  Current Outpatient Prescriptions   Medication Sig     acetaminophen (TYLENOL) 500 MG tablet Take 500-1,000 mg by mouth every 6 (six) hours as needed for pain.     aspirin 81 mg chewable tablet Chew 81 mg daily.     baclofen (LIORESAL) 10 MG tablet  Take 5-10 mg by mouth 2 (two) times a day as needed.     BELSOMRA 15 mg Tab Take 15 mg by mouth at bedtime.     DULoxetine (CYMBALTA) 60 MG capsule Take 120 mg by mouth daily.      furosemide (LASIX) 20 MG tablet Take 1 tablet (20 mg total) by mouth daily.     gabapentin (NEURONTIN) 300 MG capsule Take 600 mg by mouth at bedtime as needed.      hydroxyurea (HYDREA) 500 mg capsule Take 500 mg by mouth 2 (two) times a day.     lidocaine (LIDODERM) 5 % Remove & Discard patch within 12 hours or as directed by MD     magnesium hydroxide (MILK OF MAG) 400 mg/5 mL Susp suspension Take 30 mL by mouth daily as needed.     melatonin 3 mg Tab tablet Take 1 tablet (3 mg total) by mouth at bedtime as needed.     multivitamin therapeutic (THERAGRAN) tablet Take 1 tablet by mouth daily.     senna-docusate (SENNOSIDES-DOCUSATE SODIUM) 8.6-50 mg tablet Take 2 tablets by mouth 2 (two) times a day.       Labs:      Assessment / Plan :     ICD-10-CM     1. Malignant pleural effusion J91.0 Still decreased at bases. Final cytology: Confirms is malignant effusion with Mantel Cell lymphoma      Addendum 2   The purpose of this addendum is to report results of [facility] FISH studies that demonstrate an 11;14 translocation, diagnostic of involvement by mantle cell lymphoma.     DIAGNOSIS:   PLEURAL FLUID, ASPIRATION WITH CELL BLOCK PREPARATION:       - MANTLE CELL LYMPHOMA           2. Mantle cell lymphoma, unspecified body region C83.10     3. Cancer associated pain G89.3 Morphine IRGabapentin, Cymbalta   4. Severe malnutrition E43     5. Chronic neck pain M54.2... Baclofen,Cymbalta, Lidoderm; will increase ,Morphine MSCONTIN   6 Major Depression F32.9 Duloxetine   7 Fatigue R53.81...    8 Opioid dependence F11.20    8. Hyponatremia E87.1 SIADH, follow          Electronically signed by: Flores Zhou MD

## 2021-06-10 NOTE — PROGRESS NOTES
"Call placed to bed board as patient is being admitted to P2 for observation for hypoxia.  Patient was in clinic today and saw Dr Mendoza.  He had a chest xray after the visit which showed \"Moderate to large bilateral pleural effusions, similar to previous. Underlying atelectasis in each lung base.\" Dr Mendoza would like the hospitalist to follow.  He is going to be in room 204.  Report to be called to P2 prior to his arrival.  He will be getting Opdivo prior to heading over.    Aliyah Nick RN 4/27/17 9001  "

## 2021-06-10 NOTE — PROGRESS NOTES
I was called from the first floor infusion to come and speak with Julian's wife, Sola.  She explained to me that her  was on a medication they could not afford, then prescribed Rituxan which again they cannot afford and she doesn't know what to do.  They were told by Dr. Mendoza that Opdivo may be something to try and they should get financial help for that.  Judi, financial counselor, provided Sola with written information she rec'd from the collab nurses and also had Sola sign an application to get financial assistance for Opdivo.       Given card with contact information on it. Informed of intake process and things that will be discussed with them today. Informed of services that I am able to assist with and that I can be a contact point should they need any assistance while getting treatment, or at any other point in the cancer care process. Thankful for information given. Offered a safe and supportive environment for discussion. Support and Caring offered throughout. Will follow up throughout appointments with cancer care.     I also went over some resources for them:    1.  [employer] Emergency Financial Assistance Application:  They do NOT qualify    I have explained what this program is about and gave the patient some written information.      I have gathered the necessary information from the patient and completed the online application       I told the patient and family to expect a reply within a week and to let me know if they have any questions or concerns regarding the outcome of their application or in how to obtain their fanta.    2.  [employer]: Julian *is in need of more financial assistance.  [employer] is an additional fanta available through the [employer].  I explained the fund to the patient and gave them some written information as well.  I said to expect a response in the next two weeks.  I have done the on-line application on their behalf.  They were very appreciative  of the information.      3.  Julian Selby would benefit from a meals on wheels type program. Spoke with Sola and states that  She would like to think about it..   [employer] is a free resource for patients with cancer. They make fresh meals weekly and deliver them 1 x per week to patients and families. The meals can be for up to 4 people in the household. If you do not live in the delivery area then you can have someone pick your meals up for you. [employer] is located at [address] [place] [address] the crossroads are [facility] and [place].  times are M-Th 11-6 and Friday 11-3.   I have mailed an application and general information about the program and sent it specifically to Sola per her request.    4.  [employer] Travel Assistance Program: Julian Selby may qualify  for assistance through the LLS (leukemia/Lymphoma Society).  This program offers the patient some assistance for their travel costs for treatment.  I have sent the application and general information directly to Sola per her request.  I told her to let me know if she has any questions.

## 2021-06-10 NOTE — PROGRESS NOTES
[facility]      Facility:    [facility] [[id number]]    Code Status: DNR/DNI/ Limited Interventions      Chief Complaint/Reason for Visit:  Chief Complaint   Patient presents with     H & P     Mantle Lymphoma   4/27 - 5/2/17 at [facility]    HPI:   Frankie Serna Jr. is a 77 y.o. old male with history of Mantle Cell Lymphoma on palliative chemotherapy with Opdivo directly admitted to [facility] from oncology clinic. The patient minimizes his symptoms but his wife reports that he has been increasingly dyspneic on exertion and   at rest. He has become increasingly weak ,  has minimlal appetite and has lost 30 pounds in about 3 months.  Saturating at less than 85% at the oncology clinic, had had some treatment for his mantle cell lymphoma but could not afford all the treatments.  No fevers or chills but he does report hot flashes. No nausea or vomiting. His wife also reports that he is increasingly forgetful.   He was seen in oncology clinic today due to these symptoms and he was found to have large bilateral pleural effusions and hypoxia.  He had pleurocentesis on April 28 on the left side with 1.1 L, had a right tap on May 1 with 1.5 L.  Complete cytology was pending at the time of his admission to TCU, but were thought to be malignant.  He did not have a lot of relief after his pleural effusions were tapped.    He was seen by palliative care, pain consult.  They also approach the idea of hospice, he wanted to go to TCU possible that upon discharge from TCU he will enter into hospice.  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    He is attempting to do therapy but is very weak and deconditioned.    Past Medical History:  Past Medical History:   Diagnosis Date     Arthritis      Asthma      CHF (congestive heart failure)      Coronary artery disease      Depression      Disease of thyroid gland      Hypertension      Lymphoma, non-Hodgkin's       Neuropathy      Polycythemia      Sleep apnea            Surgical History:  Past Surgical History:   Procedure Laterality Date     ABDOMINAL SURGERY       BACK SURGERY       CARDIAC SURGERY       COLON SURGERY       EYE SURGERY       FRACTURE SURGERY       JOINT REPLACEMENT       SKIN BIOPSY       TONSILLECTOMY       VAGAL NERVE STIMULATOR REMOVAL         Family History:   Family History   Problem Relation Age of Onset     Stroke Mother      Cancer Sister      Breast cancer Sister      Lymphoma Sister      No Medical Problems Son      No Medical Problems Son      No Medical Problems Daughter      No Medical Problems Daughter        Social History:    Social History     Social History     Marital status:      Spouse name: N/A     Number of children: N/A     Years of education: N/A     Social History Main Topics     Smoking status: Never Smoker     Smokeless tobacco: Never Used     Alcohol use No     Drug use: No     Sexual activity: Not on file     Other Topics Concern     Not on file     Social History Narrative          Review of Systems    There were no vitals filed for this visit.    Physical Exam    Medication List:  Current Outpatient Prescriptions   Medication Sig     acetaminophen (TYLENOL) 500 MG tablet Take 500-1,000 mg by mouth every 6 (six) hours as needed for pain.     aspirin 81 mg chewable tablet Chew 81 mg daily.     baclofen (LIORESAL) 10 MG tablet Take 5-10 mg by mouth 2 (two) times a day as needed.     BELSOMRA 15 mg Tab Take 15 mg by mouth at bedtime.     DULoxetine (CYMBALTA) 60 MG capsule Take 120 mg by mouth daily.      furosemide (LASIX) 20 MG tablet Take 1 tablet (20 mg total) by mouth daily.     gabapentin (NEURONTIN) 300 MG capsule Take 600 mg by mouth at bedtime as needed.      hydroxyurea (HYDREA) 500 mg capsule Take 500 mg by mouth 2 (two) times a day.     lidocaine (LIDODERM) 5 % Remove & Discard patch within 12 hours or as directed by MD     magnesium hydroxide (MILK OF MAG)  400 mg/5 mL Susp suspension Take 30 mL by mouth daily as needed.     melatonin 3 mg Tab tablet Take 1 tablet (3 mg total) by mouth at bedtime as needed.     morphine (MSIR) 15 MG tablet Take 1-2 tablets (15-30 mg total) by mouth every 4 (four) hours as needed.     morphine (MSIR) 15 MG tablet Take 2 tablets (30 mg total) by mouth 3 (three) times a day for 10 days.     multivitamin therapeutic (THERAGRAN) tablet Take 1 tablet by mouth daily.     polyethylene glycol (MIRALAX) 17 gram packet Take 1 packet (17 g total) by mouth daily for 3 days.     senna-docusate (SENNOSIDES-DOCUSATE SODIUM) 8.6-50 mg tablet Take 2 tablets by mouth 2 (two) times a day.       Labs:  5/1/17         WBC 4.0 - 11.0 thou/uL 23.1 (H)   Hemoglobin 14.0 - 18.0 g/dL 17.5   Hematocrit 40.0 - 54.0 % 53.4   Platelets 140 - 440 thou/uL 375   MPV 8.5 - 12.5 fL 9.2   Neutrophils % 50 - 70 % 88 (H)   Lymphocytes % 20 - 40 % 7 (L)   Monocytes % 2 - 10 % 3   Eosinophils % 0 - 6 % 2     5/1/17         Sodium 136 - 145 mmol/L 128 (L)   Potassium 3.5 - 5.0 mmol/L 4.9   Chloride 98 - 107 mmol/L 89 (L)   CO2 22 - 31 mmol/L 32 (H)   Anion Gap, Calculation 5 - 18 mmol/L 7   Glucose 70 - 125 mg/dL 92   Calcium 8.5 - 10.5 mg/dL 8.9   BUN 8 - 28 mg/dL 21   Creatinine 0.70 - 1.30 mg/dL 0.73   GFR MDRD Non Af Amer >60 mL/min/1.73m2 >60           Assessment / Plan:    ICD-10-CM    1. Malignant pleural effusion J91.0 Still decreased at bases. Final cytology pending.Lasix   2. Mantle cell lymphoma, unspecified body region C83.10    3. Cancer associated pain G89.3 Morphine IRGabapentin, Cymbalta   4. Severe malnutrition E43    5. Cervical Disc Degeneration M50.30 Baclofen,Cymbalta, LidodermMorphi   6. Hyponatremia E87.1 SIADH, follow             Electronically signed by: Flores Zhou MD

## 2021-06-10 NOTE — PROGRESS NOTES
[facility]      Facility:    ANSWER ROOMING ACTIVITY QUESTION    Code Status: DNR/DNI/ Limited Interventions      Chief Complaint/Reason for Visit:  Chief Complaint   Patient presents with     Problem Visit       HPI:   Frankie Serna Jr. is a 77 y.o. old male with history of Mantle Cell Lymphoma on palliative chemotherapy with Opdivo directly admitted to [facility] from oncology clinic. The patient minimizes his symptoms but his wife reports that he has been increasingly dyspneic on exertion and at rest. He has become increasingly weak , has minimlal appetite and has lost 30 pounds in about 3 months.  Saturating at less than 85% at the oncology clinic, had had some treatment for his mantle cell lymphoma but could not afford all the treatments. No fevers or chills but he does report hot flashes. No nausea or vomiting. His wife also reports that he is increasingly forgetful.     He was seen in oncology clinic today due to these symptoms and he was found to have large bilateral pleural effusions and hypoxia.  He had pleurocentesis on April 28 on the left side with 1.1 L, had a right tap on May 1 with 1.5 L. Complete cytology was pending at the time of his admission to TCU, but were thought to be malignant. He did not have a lot of relief after his pleural effusions were tapped.     He was seen by palliative care, pain consult. They also approach the idea of hospice, he wanted to go to TCU possible that upon discharge from TCU he will enter into hospice. 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     He is attempting to do therapy but is very weak and deconditioned.    Family Conference today: I joined near the end, met with his wife (Racheal) and Julian; hard for her to care for him as weak as he is. We discussed that there may be need for changes upon discharge.    Past Medical History:  Past Medical History:   Diagnosis Date     Arthritis      Asthma      CHF  (congestive heart failure)      Coronary artery disease      Depression      Disease of thyroid gland      Hypertension      Lymphoma, non-Hodgkin's      Neuropathy      Polycythemia      Sleep apnea            Surgical History:  Past Surgical History:   Procedure Laterality Date     ABDOMINAL SURGERY       BACK SURGERY       CARDIAC SURGERY       COLON SURGERY       EYE SURGERY       FRACTURE SURGERY       JOINT REPLACEMENT       SKIN BIOPSY       TONSILLECTOMY       VAGAL NERVE STIMULATOR REMOVAL              Review of Systems   Feels about the same with regard to breathing and energy: both hard    Vitals reviewed      Physical Exam     Cachectic, pale  Pleasant  Quiet  Neck: has a u-shaped pillow under his neck  Wide spread adenopathy neck, axillae, supraclavicular: enlarged and rubbery   Cardiovascular: Regular rhythm and normal heart sounds.    No murmur heard.  Pulmonary/Chest: He has wheezes. He has no rales.   Abdominal: Soft. There is no tenderness.   Musculoskeletal: He exhibits edema. WeakLow muscle mass   Lymphadenopathy:     He has cervical, axillary, supraclavicular adenopathy.   Neurological: He is alert and oriented to person, place, and time.   Skin: Skin is warm and dry. No rash noted. There is pallor.   Psychiatric: His behavior is normal.   Flat affect       Therapy report: transfers are unsafe, walked 400 ' FWW,stood 3 minutes, needs contact guard using the toilet. [place] 24        Medication List:  Current Outpatient Prescriptions   Medication Sig     acetaminophen (TYLENOL) 500 MG tablet Take 500-1,000 mg by mouth every 6 (six) hours as needed for pain.     aspirin 81 mg chewable tablet Chew 81 mg daily.     baclofen (LIORESAL) 10 MG tablet Take 5-10 mg by mouth 2 (two) times a day as needed.     BELSOMRA 15 mg Tab Take 15 mg by mouth at bedtime.     DULoxetine (CYMBALTA) 60 MG capsule Take 120 mg by mouth daily.      furosemide (LASIX) 20 MG tablet Take 1 tablet (20 mg total) by mouth daily.      gabapentin (NEURONTIN) 300 MG capsule Take 600 mg by mouth at bedtime as needed.      hydroxyurea (HYDREA) 500 mg capsule Take 500 mg by mouth 2 (two) times a day.     lidocaine (LIDODERM) 5 % Remove & Discard patch within 12 hours or as directed by MD     magnesium hydroxide (MILK OF MAG) 400 mg/5 mL Susp suspension Take 30 mL by mouth daily as needed.     melatonin 3 mg Tab tablet Take 1 tablet (3 mg total) by mouth at bedtime as needed.     morphine (MSIR) 15 MG tablet Take 1-2 tablets (15-30 mg total) by mouth every 4 (four) hours as needed.     morphine (MSIR) 15 MG tablet Take 2 tablets (30 mg total) by mouth 3 (three) times a day for 10 days.     multivitamin therapeutic (THERAGRAN) tablet Take 1 tablet by mouth daily.     senna-docusate (SENNOSIDES-DOCUSATE SODIUM) 8.6-50 mg tablet Take 2 tablets by mouth 2 (two) times a day.       Labs:      Assessment / Plan :     ICD-10-CM     1. Malignant pleural effusion J91.0 Still decreased at bases. Final cytology: Confirms is malignant effusion with Mantel Cell lymphoma      Addendum 2   The purpose of this addendum is to report results of [facility] FISH studies that demonstrate an 11;14 translocation, diagnostic of involvement by mantle cell lymphoma.     DIAGNOSIS:   PLEURAL FLUID, ASPIRATION WITH CELL BLOCK PREPARATION:       - MANTLE CELL LYMPHOMA           2. Mantle cell lymphoma, unspecified body region C83.10     3. Cancer associated pain G89.3 Morphine IRGabapentin, Cymbalta   4. Severe malnutrition E43     5. Chronic neck pain M54.2... Baclofen,Cymbalta, Lidoderm,Morphine   6 Major Depression F32.9 Duloxetine   7 Fatigue R53.81...    8 Acute Respiratory failure J96.01    9. Hyponatremia E87.1 SIADH, follow          Electronically signed by: Flores Zhou MD

## 2021-06-10 NOTE — PROGRESS NOTES
Patient arrived ambulatory after clinic visit at approximately 2:15. Oxygen saturation noted 85% on room air/ oxygen applied at 1L via Nasal cannula with O2 saturation improved to 91-92%. Patient denies feeling Short of breath. Patient/wife ordered lunch due to missing lunch today/ both ate lunch during visit. IV access obtained right forearm. Patient to receive Opdivo during visit today but did receive notification from Aliyah SWAIN Opdivo not approved at this time for patient to receive/ patient to be admitted to P2 inpatient unit for further care as ordered per Dr. Mendoza - patient and wife both agree. Patient taken to room 204 on P2 at 1520 via wheelchair for hospital admission/ report was called to P2 RN per Aliyah SWAIN.

## 2021-06-10 NOTE — PROGRESS NOTES
[facility]    Facility:   [facility] [[id number]]     Code Status: DNR/DNI/ Limited Interventions  PCP: Saeed Grajeda MD   Phone: [phone]   Fax: [phone]      CHIEF COMPLAINT/REASON FOR VISIT:  Chief Complaint   Patient presents with     Discharge Summary     Mantle cell lymphoma       HISTORY COURSE:  Has a history of mantle cell lymphoma who has been on palliative chemotherapy.  He has lost approximately 30 pounds in 3 months, has become increasingly weak with dyspnea on exertion and finally dyspnea at rest.  He was at the oncology clinic when his O2 saturation was less than 85%.  He had large bibasilar pleural effusions and hypoxia.  He was admitted to the hospital had pleurocentesis on the left 1.1 L, on the right 1.5 L.  Cytology did come back with mantle cell lymphoma.    He was at [facility] for rehabilitation.  He was quite malnourished but did get to the point where he was able to transfer, walk short distances with his walker independently.  With this level of independence his wife felt he could return home, hospice is going to be involved at discharge.  Note he had been on morphine MS Contin 60 mg 3 times a day, had also been on morphine immediate acting.  Julian himself asked that the short acting be discontinued.    He needs to be on oxygen at 3 L/min.    Review of Systems     He looks forward to going home, he is still pretty fatigued.    Vitals:    05/18/17 0923   BP: 132/63   Pulse: (!) 103   Resp: 18   Temp: 96.7  F (35.9  C)   SpO2: 93%       Physical Exam    Cachectic, pale, smiling and more relaxed  Quiet  Wide spread adenopathy neck, axillae, supraclavicular: enlarged and rubbery   Cardiovascular: Regular rhythm and normal heart sounds.  No murmur heard.  Pulmonary/Chest: He has no rales,  But decreased at the posterior bases   Abdominal: Soft. There is no tenderness.   Musculoskeletal: He exhibits edema. Weak with low muscle mass    Lymphadenopathy:     He has cervical, axillary, supraclavicular adenopathy.   Neurological: He is alert and oriented to person, place, and time.   Skin: Skin is warm and dry. No rash noted. There is pallor.   Psychiatric: His behavior is normal.   Flat affect       MEDICATION LIST:  Current Outpatient Prescriptions   Medication Sig     acetaminophen (TYLENOL) 500 MG tablet Take 500-1,000 mg by mouth every 6 (six) hours as needed for pain.     aspirin 81 mg chewable tablet Chew 81 mg daily.     atropine 1 % ophthalmic solution Place 1-2 drops under the tongue see administration instructions. 1-2 drops prn every 4hours as needed for excessive secetions per MARKUS kit  Indications: secretions     baclofen (LIORESAL) 10 MG tablet Take 5-10 mg by mouth 2 (two) times a day as needed.     BELSOMRA 15 mg Tab Take 15 mg by mouth at bedtime.     bisacodyl (DULCOLAX) 10 mg suppository Insert 10 mg into the rectum daily as needed (constipation). as needed for constipation per MARKUS kit  Indications: Constipation     DULoxetine (CYMBALTA) 60 MG capsule Take 120 mg by mouth daily.      furosemide (LASIX) 20 MG tablet Take 1 tablet (20 mg total) by mouth daily.     gabapentin (NEURONTIN) 300 MG capsule Take 600 mg by mouth at bedtime as needed.      haloperidol (HALDOL) 2 mg/mL solution Take 0.5-2 mg by mouth see administration instructions. 0.5mg-2mg prn every 4hours for nausea/or delirium per MARKUS kit  Indications: Delirium, nausea     hydroxyurea (HYDREA) 500 mg capsule Take 500 mg by mouth 2 (two) times a day.     lidocaine (LIDODERM) 5 % Remove & Discard patch within 12 hours or as directed by MD     LORazepam (ATIVAN) 0.5 MG tablet Take 0.5-2 mg by mouth see administration instructions. 0.5-2mg prn every 4hours as need for anxiety per MARKUS kit  Indications: anxiety     magnesium hydroxide (MILK OF MAG) 400 mg/5 mL Susp suspension Take 30 mL by mouth daily as needed.     melatonin 3 mg Tab tablet Take 1 tablet (3 mg total) by  mouth at bedtime as needed.     morphine (MS CONTIN) 60 MG 12 hr tablet Take 60 mg by mouth 3 (three) times a day. [id number]  Indications: Severe Pain     morphine 20 mg/1 mL concentrated solution Take 5-10 mg by mouth every 4 (four) hours as needed for pain (SOB). Indications: pain/dyspnea     multivitamin therapeutic (THERAGRAN) tablet Take 1 tablet by mouth daily.     senna-docusate (SENNOSIDES-DOCUSATE SODIUM) 8.6-50 mg tablet Take 2 tablets by mouth 2 (two) times a day.       DISCHARGE DIAGNOSIS:         ICD-10-CM          ICD-10-CM    1. Malignant pleural effusion J91.0    2. Mantle cell lymphoma, unspecified body region C83.10    3. Cancer associated pain G89.3    4. Severe malnutrition E43    5. Peripheral Neuropathy G60.9    6. Major Depression, Single Episode F32.9    7. Hyponatremia E87.1    8. Fatigue R53.81     R53.83    9. Cervical Disc Degeneration M50.30    10. Chronic neck pain M54.2     G89.29    11. Opioid dependence, continuous F11.20               MEDICAL EQUIPMENT NEEDS:  He is going home with  [facility] hospice is given oxygen, electric bed, wheelchair, front wheeled walker with skis on the back.    DISCHARGE PLAN/FACE TO FACE:  I certify that services are/were furnished while this patient was under the care of a physician and that a physician or an allowed non-physician practitioner (NPP), had a face-to-face encounter that meets the physician face-to-face encounter requirements. The encounter was in whole, or in part, related to the primary reason for home health. The patient is confined to his/her home and needs intermittent skilled nursing, physical therapy, speech-language pathology, or the continued need for occupational therapy. A plan of care has been established by a physician and is periodically reviewed by a physician.  Date of Face-to-Face Encounter: 5/18/17    [facility] Care : he has been certified to have fewer than 6 months to live         The patient is, or has  been, under my care and I have initiated the establishment of the plan of care. This patient will be followed by a physician who will periodically review the plan of care.      Electronically signed by: Flores Zhou MD

## 2021-06-10 NOTE — PROGRESS NOTES
[facility]      Facility:    [facility] [[id number]]    Code Status: DNR/DNI/ Limited Interventions      Chief Complaint/Reason for Visit:  Chief Complaint   Patient presents with     Problem Visit     weakness / Mantle cell Lymphoma       HPI:   Frankie Serna Jr. is a 77 y.o. old male with history of Mantle Cell Lymphoma on palliative chemotherapy with Opdivo directly admitted to [facility] from oncology clinic. The patient minimizes his symptoms but his wife reports that he has been increasingly dyspneic on exertion and at rest. He has become increasingly weak , has minimlal appetite and has lost 30 pounds in about 3 months.  Saturating at less than 85% at the oncology clinic, had had some treatment for his mantle cell lymphoma but could not afford all the treatments. No fevers or chills but he does report hot flashes. No nausea or vomiting. His wife also reports that he is increasingly forgetful.     He was seen in oncology clinic today due to these symptoms and he was found to have large bilateral pleural effusions and hypoxia.  He had pleurocentesis on April 28 on the left side with 1.1 L, had a right tap on May 1 with 1.5 L. Complete cytology was pending at the time of his admission to TCU, but were thought to be malignant. He did not have a lot of relief after his pleural effusions were tapped.     He was seen by palliative care, pain consult. They also approach the idea of hospice, he wanted to go to TCU possible that upon discharge from TCU he will enter into hospice. 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     He is attempting to do therapy but is very weak and deconditioned.    Past Medical History:  Past Medical History:   Diagnosis Date     Arthritis      Asthma      CHF (congestive heart failure)      Coronary artery disease      Depression      Disease of thyroid gland      Hypertension      Lymphoma, non-Hodgkin's      Neuropathy       Polycythemia      Sleep apnea            Surgical History:  Past Surgical History:   Procedure Laterality Date     ABDOMINAL SURGERY       BACK SURGERY       CARDIAC SURGERY       COLON SURGERY       EYE SURGERY       FRACTURE SURGERY       JOINT REPLACEMENT       SKIN BIOPSY       TONSILLECTOMY       VAGAL NERVE STIMULATOR REMOVAL              Review of Systems   Feels about the same with regard to breathing and energy.    Blood pressure 138/81, pulse 65, temperature (!) 96.4  F (35.8  C), resp. rate 16, SpO2 96 %.        Physical Exam   Constitutional: He is oriented to person, place, and time.   Cachectic, pale  Pleasant  Quiet     HENT:   Right Ear: External ear normal.   Left Ear: External ear normal.   Mouth/Throat: Oropharynx is clear and moist.   Eyes: Conjunctivae and EOM are normal.   Neck:   Wide spread adenopathy neck, axillae, supraclavicular: enlarged and rubbery   Cardiovascular: Regular rhythm and normal heart sounds.    No murmur heard.  Pulmonary/Chest: He has wheezes. He has no rales.   Abdominal: Soft. There is no tenderness.   Musculoskeletal: He exhibits edema.   Weak  Low muscle mass   Lymphadenopathy:     He has cervical adenopathy.   Neurological: He is alert and oriented to person, place, and time.   Skin: Skin is warm and dry. No rash noted. There is pallor.   Psychiatric: His behavior is normal.   Flat affect                Medication List:  Current Outpatient Prescriptions   Medication Sig     acetaminophen (TYLENOL) 500 MG tablet Take 500-1,000 mg by mouth every 6 (six) hours as needed for pain.     aspirin 81 mg chewable tablet Chew 81 mg daily.     baclofen (LIORESAL) 10 MG tablet Take 5-10 mg by mouth 2 (two) times a day as needed.     BELSOMRA 15 mg Tab Take 15 mg by mouth at bedtime.     DULoxetine (CYMBALTA) 60 MG capsule Take 120 mg by mouth daily.      furosemide (LASIX) 20 MG tablet Take 1 tablet (20 mg total) by mouth daily.     gabapentin (NEURONTIN) 300 MG capsule Take  600 mg by mouth at bedtime as needed.      hydroxyurea (HYDREA) 500 mg capsule Take 500 mg by mouth 2 (two) times a day.     lidocaine (LIDODERM) 5 % Remove & Discard patch within 12 hours or as directed by MD     magnesium hydroxide (MILK OF MAG) 400 mg/5 mL Susp suspension Take 30 mL by mouth daily as needed.     melatonin 3 mg Tab tablet Take 1 tablet (3 mg total) by mouth at bedtime as needed.     morphine (MSIR) 15 MG tablet Take 1-2 tablets (15-30 mg total) by mouth every 4 (four) hours as needed.     morphine (MSIR) 15 MG tablet Take 2 tablets (30 mg total) by mouth 3 (three) times a day for 10 days.     multivitamin therapeutic (THERAGRAN) tablet Take 1 tablet by mouth daily.     senna-docusate (SENNOSIDES-DOCUSATE SODIUM) 8.6-50 mg tablet Take 2 tablets by mouth 2 (two) times a day.       Labs:      Assessment / Plan :     ICD-10-CM     1. Malignant pleural effusion J91.0 Still decreased at bases. Final cytology: Confirms is malignant effusion with Mantel Cell lymphoma      Addendum 2   The purpose of this addendum is to report results of [facility] FISH studies that demonstrate an 11;14 translocation, diagnostic of involvement by mantle cell lymphoma.     DIAGNOSIS:   PLEURAL FLUID, ASPIRATION WITH CELL BLOCK PREPARATION:       - MANTLE CELL LYMPHOMA           2. Mantle cell lymphoma, unspecified body region C83.10     3. Cancer associated pain G89.3 Morphine IRGabapentin, Cymbalta   4. Severe malnutrition E43     5. Cervical Disc Degeneration M50.30 Baclofen,Cymbalta, LidodermMorphi   6 Major Depression F32.9 Duloxetine   7. Hyponatremia E87.1 SIADH, follow          Electronically signed by: Flores Zhou MD

## 2021-06-10 NOTE — PROGRESS NOTES
[facility] Progress Note    Patient: Frankie Serna Jr.  MRN: [id number]  Date of Service: 04/27/2017       Reason for visit      1. 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 Polycythemia vera        Assessment     1. Mantle cell lymphoma diagnosed with biopsy of right abdominal lymph node. Stage III or perhaps stage IV.  This was progressive with bilateral pleural effusions and massive lymphadenopathy and splenic megaly.  Started on palliative treatment with Imbruvica on November 23, 2016.  Response visible. However, he has not been able to get Imbruvica after initial 8 weeks of it. Now off of it due to insurance reasons. Apparently cannot afford Rituxan either. His LN are getting bigger.  Now comes in with increasing shortness of breath and lower extremity edema.  2. History of polycythemia currently on treatment with hydroxyurea and phlebotomy.  Current labs are stable.  3. Neuropathy.  4. Dementia.  5. Back and abdominal discomfort which is most likely from his lymphoma.  He is currently on oxycodone 20 mg 6 times a day.  OxyContin apparently did not help.   6. Hypoxia with bilateral massive pleural effusions.    Plan     1. We are asking drug company for compassionate use of Opdivo to see if that would help.  In the meantime I think he will need to be admitted in the hospital for management of his bilateral pleural effusions.  I did discuss his case with the admitting hospitalist.  I also spoke to his wife that he might need to be in a TCU.  She is also wondering about hospice care.  Which if he is not going to be able to get his treatment is appropriate option.  2. We will follow him up in the hospital and afterwards.   3. Discussed with him and his wife about  pain medication.    Clinical stage      Possibly stage II    History     Frankie Serna Jr. is a very pleasant 77 y.o. old male with a history of lymphadenopathy located in his pelvis measuring up to 2.5 cm in size  presenting on a CAT scan done for an unrelated reason back in September 2014.  This has worsened on his most recent CT scan done in April 2015.  This is by no means of bulky adenopathy however.  This has not been biopsied.  He has a prior history of polycythemia vera for which I saw him way back in 2011.  I did start him on hydroxyurea.  Which he has taken off and on.  He however then switched his care to [place] [facility].  Has been followed by doctors over there.       Wife thinks he has gone down neurologically. He has been seen at [facility] neurolog[facility] clinic.    In August 2015 he had biopsy of the right external iliac LN biopsied that showed Mantle cell lymphoma. He was not recommended any treatment by Dr. North due to his comorbities.  He has since been followed.  He noticed more lymph nodes in his neck and armpit so came here to discuss his treatment options.  He was seen by Dr. North second week of October 2016.  Dr. North recommended hospice care.  He did not like it.  I offered to treat him with Ibrutinib if we can obtain it for him.  He was able to get that medication through a fanta and started that on 23rd of November 2016.  He thinks his lymph nodes might be getting slightly smaller.  He has some rash on his face and chest.    He continues to have pain and continues to take a lot of pain medication.  He describes having pain all over and his body but specifically sign his neck and back towards the left side.  According to his wife he forgets when he has taken his last pain medication and insistent get getting another one.  I had to increase the dose of his oxycodone to 20 mg 6 times a day total of 120 mg daily.  His pain is still there and according to his wife he wakes her up sometime even in the middle of the night to give him the pain medication. He is not happy with that.   In the meantime he has not been able to procure a Ibrutinib due to insurance reasons.  He only took it for about 8 weeks starting  Thanksgiving of 2016 to January 16, 2017.  He had good response to it but now his lymph nodes are starting to get bigger again.  I then offered to treat him with single agent rituximab.  After checking through insurance that also was not financially feasible for them.    She comes in today to discuss his care as well as sharing concerned that he is developing lower extremity edema.  She tells me that his pain was significantly worse couple nights ago.  Then he laid down in his recliner and in fact did not require any pain medication for over 12 hours.  Since that time his pain has not been as bad.  He also continues to have difficult time hearing.  He does have plugged ear on his right side with earwax.    Past Medical History     Past Medical History:   Diagnosis Date     Arthritis      Asthma      CHF (congestive heart failure)      Coronary artery disease      Depression      Disease of thyroid gland      Hypertension      Lymphoma, non-Hodgkin's      Neuropathy      Polycythemia      Sleep apnea        Review of Systems   Constitutional  Constitutional (WDL): Exceptions to WDL  Fatigue: Fatigue not relieved by rest - Limiting instrumental ADL  Neurosensory  Neurosensory (WDL): Exceptions to WDL (feet purple)  Ataxia: Asymptomatic, clinical or diagnostic observations only, intervention not indicated (recent fall)  Peripheral Sensory Neuropathy: Asymptomatic, loss of deep tendon reflexes or paresthesia (top of feet)  Confusion: Mild disorientation (slow thought process)  Cardiovascular  Cardiovascular (WDL): Exceptions to WDL  Edema: Yes  Edema Limbs: Grade 2 (feet and legs wears compression stockings)  Pulmonary  Respiratory (WDL): Exceptions to WDL  Cough: Mild symptoms, nonprescription intervention indicated (productive cough with gray/green phlem)  Dyspnea: Shortness of breath at rest, limiting self care ADL  Hypoxia: Decreased oxygen saturation with exercise (e.g., pulse oximeter <88%), intermittent  supplemental oxygen  Gastrointestinal  Gastrointestinal (WDL): Exceptions to WDL  Anorexia: Loss of appetite without alteration in eating habits  Constipation: Persistent symptoms with regular use of laxatives or enemas, limiting instrumental ADL  Dry Mouth: Symptomatic (e.g., dry or thick saliva) without significant dietary alteration, unstimulated saliva flow >0.2 ml/min  Genitourinary  Genitourinary (WDL): All genitourinary elements are within defined limits  Integumentary  Integumentary (WDL): Exceptions to WDL  Rash Maculo-Papular: Macules/papules covering <10% BSA with or without symptoms (e.g., pruritus, burning, tightness) (face)  Patient Coping  Patient Coping: Accepting  Accompanied by  Accompanied by: Family Member (wife)    ECOG performance status and Distress Assessment      ECOG Performance:    ECOG Performance Status: 2    Distress Assessment  Distress Assessment Score: 2:     Pain Status  Currently in Pain: Yes    Vital Signs     Vitals:    04/27/17 1121   BP: 139/78   Pulse: (!) 109   Temp: 97.6  F (36.4  C)   SpO2: (!) 88%       Physical Exam     GENERAL: No acute distress. Cooperative in conversation.   HEENT: Pupils are equal, round and reactive. Oral mucosa is clean and intact. No ulcerations or mucositis noted. No bleeding noted.  His right ear is plugged with wax.  RESP:Chest symmetric lungs are clear bilaterally per auscultation. Regular respiratory rate. No wheezes or rhonchi.  CV: Normal S1 S2 Regular, rate and rhythm. No murmurs.  ABD: Nondistended, soft, nontender. Positive bowel sounds. No organomegaly.   EXTREMITIES: He has 2+ swelling on the dorsum of both of his feet.  Scrotal swelling as well.  NEURO: Non- focal. Alert and oriented x3.  Cranial nerves appear intact. He does have short term memory loss.  PSYCH: Within normal limits. No depression or anxiety.  SKIN: Fine rash on his face and chest. Warm dry intact.    LYMPH NODES: Bilateral cervical, supraclavicular, axillary lymph  node examination was done. The LN are getting bigger. The right axillary adenopathies is about 4.5 cm in size previously 4cm.  This appears bigger than before.  He also has enlarged lymph node in his neck in both posterior as well as some anterior cervical chains.     Lab Results     Results for orders placed or performed in visit on 04/27/17   Comprehensive Metabolic Panel   Result Value Ref Range    Sodium 129 (L) 136 - 145 mmol/L    Potassium 4.5 3.5 - 5.0 mmol/L    Chloride 90 (L) 98 - 107 mmol/L    CO2 34 (H) 22 - 31 mmol/L    Anion Gap, Calculation 5 5 - 18 mmol/L    Glucose 103 70 - 125 mg/dL    BUN 20 8 - 28 mg/dL    Creatinine 0.88 0.70 - 1.30 mg/dL    GFR MDRD Af Amer >60 >60 mL/min/1.73m2    GFR MDRD Non Af Amer >60 >60 mL/min/1.73m2    Bilirubin, Total 0.5 0.0 - 1.0 mg/dL    Calcium 9.3 8.5 - 10.5 mg/dL    Protein, Total 7.6 6.0 - 8.0 g/dL    Albumin 3.4 (L) 3.5 - 5.0 g/dL    Alkaline Phosphatase 170 (H) 45 - 120 U/L    AST 24 0 - 40 U/L    ALT 17 0 - 45 U/L   HM1 (CBC with Diff)   Result Value Ref Range    WBC 25.1 (H) 4.0 - 11.0 thou/uL    RBC 5.40 4.40 - 6.20 mill/uL    Hemoglobin 17.6 14.0 - 18.0 g/dL    Hematocrit 52.2 40.0 - 54.0 %    MCV 97 80 - 100 fL    MCH 32.6 27.0 - 34.0 pg    MCHC 33.7 32.0 - 36.0 g/dL    RDW 12.7 11.0 - 14.5 %    Platelets 449 (H) 140 - 440 thou/uL    MPV 9.0 8.5 - 12.5 fL    Neutrophils % 89 (H) 50 - 70 %    Lymphocytes % 6 (L) 20 - 40 %    Monocytes % 4 2 - 10 %    Eosinophils % 1 0 - 6 %    Basophils % 0 0 - 2 %    Neutrophils Absolute 22.1 (H) 2.0 - 7.7 thou/uL    Lymphocytes Absolute 1.4 0.8 - 4.4 thou/uL    Monocytes Absolute 1.0 (H) 0.0 - 0.9 thou/uL    Eosinophils Absolute 0.3 0.0 - 0.4 thou/uL    Basophils Absolute 0.1 0.0 - 0.2 thou/uL         Imaging Results     Xr Chest Pa And Lateral    Result Date: 4/29/2017  XR CHEST PA AND LATERAL 4/29/2017 6:42 PM INDICATION: F/U on pleural effusion COMPARISON: 4/27/2017. FINDINGS: Moderate bilateral pleural effusions. The  pleural effusion on the left has diminished in volume since 4/27/2017. Associated bibasilar atelectasis. Heart size probably normal. No pneumothorax.    Xr Chest Pa And Lateral    Result Date: 4/27/2017  XR CHEST PA AND LATERAL 4/27/2017 12:39 PM INDICATION: Mantle cell lymphoma, intra-abdominal lymph nodes COMPARISON: CT 10/31/2016 FINDINGS: Moderate to large bilateral pleural effusions, similar to previous. Underlying atelectasis in each lung base.    Us Thoracentesis    Result Date: 4/28/2017  1. LEFT THORACENTESIS 2. ULTRASOUND GUIDANCE 4/28/2017 3:52 PM INDICATION: Pleural effusion. PROCEDURE: Informed consent obtained. Time out performed. The chest was prepped and draped in sterile fashion. 10 mL of 1 % lidocaine was infused into the local soft tissues. Under ultrasound guidance, a 5 Indonesian Yueh catheter system was placed into the pleural effusion. 1.1 liters of clear yellow fluid were removed and sent to lab, if requested. Patient tolerated procedure well. RADIOLOGIC SUPERVISION AND INTERPRETATION: ULTRASOUND GUIDANCE: Images demonstrate the pleural effusion. Catheter seen in good position.     CONCLUSION: Status post left ultrasound-guided thoracentesis.    Total time spent was 40 minutes, more than half of it was in face-to-face counseling regarding disease state, treatment, side effects and management.      Derick Mendoza MD

## 2021-06-11 NOTE — PROGRESS NOTES
Call came in today stating that Frankie is going to [facility] today.  Dr Mendoza notified.    Aliyah Nick RN 6/8/17 9023